# Patient Record
Sex: MALE | URBAN - METROPOLITAN AREA
[De-identification: names, ages, dates, MRNs, and addresses within clinical notes are randomized per-mention and may not be internally consistent; named-entity substitution may affect disease eponyms.]

---

## 2017-10-04 ENCOUNTER — NURSE TRIAGE (OUTPATIENT)
Dept: ADMINISTRATIVE | Facility: CLINIC | Age: 67
End: 2017-10-04

## 2017-10-04 NOTE — TELEPHONE ENCOUNTER
Reason for Disposition   Patient sounds very sick or weak to the triager    Protocols used: ST WEAKNESS (GENERALIZED) AND FATIGUE-A-[place]    Caregiver calling with concerns of Pt health becoming worse.  Caregiver states Pt has seen Cardiologist for heart condition, and MD is stating Pt is fine/nothing wrong but Pt cannot walk up 3 steps without becoming short of breath.  As Pt is sitting he is having difficulty breathing.  Pt is scheduled to see Pulmonologist, but afraid of waiting.  Care advice given.